# Patient Record
Sex: FEMALE | Race: OTHER | NOT HISPANIC OR LATINO | URBAN - METROPOLITAN AREA
[De-identification: names, ages, dates, MRNs, and addresses within clinical notes are randomized per-mention and may not be internally consistent; named-entity substitution may affect disease eponyms.]

---

## 2023-12-25 ENCOUNTER — EMERGENCY (EMERGENCY)
Facility: HOSPITAL | Age: 83
LOS: 1 days | Discharge: HOME | End: 2023-12-25
Attending: STUDENT IN AN ORGANIZED HEALTH CARE EDUCATION/TRAINING PROGRAM | Admitting: STUDENT IN AN ORGANIZED HEALTH CARE EDUCATION/TRAINING PROGRAM
Payer: MEDICARE

## 2023-12-25 VITALS
WEIGHT: 145.06 LBS | RESPIRATION RATE: 16 BRPM | HEART RATE: 74 BPM | SYSTOLIC BLOOD PRESSURE: 114 MMHG | OXYGEN SATURATION: 100 % | HEIGHT: 65 IN | DIASTOLIC BLOOD PRESSURE: 66 MMHG | TEMPERATURE: 98 F

## 2023-12-25 VITALS — RESPIRATION RATE: 18 BRPM | OXYGEN SATURATION: 92 % | HEART RATE: 70 BPM
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
## 2023-12-25 DIAGNOSIS — F03.90 UNSPECIFIED DEMENTIA, UNSPECIFIED SEVERITY, WITHOUT BEHAVIORAL DISTURBANCE, PSYCHOTIC DISTURBANCE, MOOD DISTURBANCE, AND ANXIETY: ICD-10-CM

## 2023-12-25 DIAGNOSIS — S00.81XA ABRASION OF OTHER PART OF HEAD, INITIAL ENCOUNTER: ICD-10-CM
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
## 2023-12-25 PROCEDURE — 99282 EMERGENCY DEPT VISIT SF MDM: CPT

## 2023-12-25 PROCEDURE — 99283 EMERGENCY DEPT VISIT LOW MDM: CPT

## 2023-12-25 NOTE — ED ADULT NURSE NOTE - CHIEF COMPLAINT QUOTE
pt s/p witnessed fall at [facility] nursing rehab. Pt was sitting in a recliner chair and fell forward. Pt has hematoma on the left forehead. Pt has several healing abrasions on the forehead. pt aaox1 at baseline. Pt not on AC. pt s/p witnessed fall at [facility] nursing rehab. Pt was sitting in a recliner chair and fell forward. Pt has hematoma on the left forehead. Pt has several healing abrasions on the forehead. pt aaox1 at baseline. Pt not on AC.

## 2023-12-25 NOTE — ED ADULT TRIAGE NOTE - CHIEF COMPLAINT QUOTE
pt s/p witnessed fall at [facility] nursing rehab. Pt was sitting in a recliner chair and fell forward. Pt has hematoma on the left forehead. Pt has several healing abrasions on the forehead. pt aaox1 at baseline. Pt not on AC. pt s/p witnessed fall at [facility] nursing rehab. Pt was sitting in a recliner chair and fell forward. Pt has hematoma on the left forehead. Pt has several healing abrasions on the forehead. pt aaox1 at baseline. Pt not on AC.

## 2023-12-25 NOTE — ED PROVIDER NOTE - CLINICAL SUMMARY MEDICAL DECISION MAKING FREE TEXT BOX
per moslt review paitnt do not hospitalize and comfort care only. does not appear to be in any distress or pain. as such will dc bach to NH. message left with next of kin (niece). no response received.

## 2023-12-25 NOTE — ED ADULT NURSE NOTE - OBJECTIVE STATEMENT
BIBEMS from NH with paperwork for witnessed fall from recliner to floor with head strike, no loc/seizure/ac use. Baseline aox1 with lethargy and low coop.     On assessment- wakes to painful stimulus, unable to follow commands or verbalize, no apparent distress, breathing even and unlabored with 5L nc on arrival- no desat when o2 weened off, +significant hematoma to L forehead without skin break, no apparent bleeding or other trauma to body.

## 2023-12-25 NOTE — ED PROVIDER NOTE - PHYSICAL EXAMINATION
General: sleeping but arousable to verbal stimulation  Skin:  Appropriate color for ethnicity  HENMT: mild abrasion to left maxillary area, nontender, head otherwise normocephalic and atraumatic  EYES: Conjunctiva clear. nonicteric sclera  Cardiac: well perfused  Respiratory: breathing comfortably on room air  Abdominal: nondistended  MSK:  no visualized external signs of trauma.  Neurological: sleeping, arousable, nonmeaningful lowly audible words sent, movement of all extremities, but weak and slow.

## 2023-12-25 NOTE — ED PROVIDER NOTE - PATIENT PORTAL LINK FT
You can access the FollowMyHealth Patient Portal offered by [facility] by registering at the following website: [email or web address]. By joining [employer]’s FollowMyHealth portal, you will also be able to view your health information using other applications (apps) compatible with our system. You can access the FollowMyHealth Patient Portal offered by [facility] by registering at the following website: [email or web address]. By joining [employer]’s FollowMyHealth portal, you will also be able to view your health information using other applications (apps) compatible with our system.

## 2023-12-25 NOTE — ED ADULT NURSE NOTE - NSFALLRISKINTERV_ED_ALL_ED
Assistance OOB with selected safe patient handling equipment if applicable/Assistance with ambulation/Communicate fall risk and risk factors to all staff, patient, and family/Monitor gait and stability/Monitor for mental status changes and reorient to person, place, and time, as needed/Provide visual cue: yellow wristband, yellow gown, etc/Reinforce activity limits and safety measures with patient and family/Toileting schedule using arm’s reach rule for commode and bathroom/Use of alarms - bed, stretcher, chair and/or video monitoring/Call bell, personal items and telephone in reach/Instruct patient to call for assistance before getting out of bed/chair/stretcher/Non-slip footwear applied when patient is off stretcher/[place] to call system/Physically safe environment - no spills, clutter or unnecessary equipment/Purposeful Proactive Rounding/Room/bathroom lighting operational, light cord in reach Assistance OOB with selected safe patient handling equipment if applicable/Assistance with ambulation/Communicate fall risk and risk factors to all staff, patient, and family/Monitor gait and stability/Monitor for mental status changes and reorient to person, place, and time, as needed/Provide visual cue: yellow wristband, yellow gown, etc/Reinforce activity limits and safety measures with patient and family/Toileting schedule using arm’s reach rule for commode and bathroom/Use of alarms - bed, stretcher, chair and/or video monitoring/Call bell, personal items and telephone in reach/Instruct patient to call for assistance before getting out of bed/chair/stretcher/Non-slip footwear applied when patient is off stretcher/[place] to call system/Physically safe environment - no spills, clutter or unnecessary equipment/Purposeful Proactive Rounding/Room/bathroom lighting operational, light cord in reach

## 2023-12-25 NOTE — ED PROVIDER NOTE - OBJECTIVE STATEMENT
83f hz dementia, at baseline minimall alert/oriented. sent in from [facility] after unwitnessed fall. patient does not provide history 83f hz dementia, at baseline minimall alert/oriented. sent in from [facility] after unwitnessed fall. patient does not provide history